# Patient Record
(demographics unavailable — no encounter records)

---

## 2025-04-22 NOTE — HISTORY OF PRESENT ILLNESS
[FreeTextEntry1] : 76 year old F with septal defect surgery, colon cancer s/p resection for stage 1 (2020) , anxiety  Here for eval of shoulder and hip pain She reports around 1 month ago (3/2025) she woke up with pain in legs (hip groin area)  Reports no significant changes in lifestyle other than taking Mg and also using some neck anti wrinkle cream Then she developed shoulder pain - difficulty combing hair, getting dressed, getting up from seated position Reports new onset numbness in right thumb morning stiffness reports chills.  up to date with colonoscopy and mammogram  Has fatigue No headaches, jaw claudication, no vision changes   Patient denies, joint swelling, joint erythema/warmth, , fever, , weight loss, nasopharyngeal ulcers, chest pain, abdominal pain, , cough, SOB, nausea, vomiting, diarrhea, , blood in stool, dysuria, hematuria, rash, , Raynaud's, alopecia, dry eyes, dry mouth (unless night time with mouth breathing),  headaches, eye pain/redness, vision changes,  muscle weakness, dysphagia, , Hx of DVT/PEs.    labs 3.2025 HsCRP  18.6 ESR 14 neg RF, Elena, RNP, SSA, SSB, dsDNA, Scl-70, centromere CCP       PMHx: As above PSHx: septal defect surgery, colon cancer s/p resection , cataracts surgery  Family Hx: Denies known family history of rheumatologic conditions including RA, SLE, Sjogren's, Myositis, scleroderma, or vasculitis Social Hx:  Smoking Hx: denies EtOH Hx: denies Drug use: denies Occupation: retired

## 2025-04-22 NOTE — ASSESSMENT
[FreeTextEntry1] : 76 year old F with septal defect surgery, colon cancer s/p resection for stage 1 (2020) , anxiety  Here for eval of shoulder and hip pain She reports around 1 month ago (3/2025) she woke up with pain in legs (hip groin area)  Reports no significant changes in lifestyle other than taking Mg and also using some neck anti wrinkle cream Then she developed shoulder pain - difficulty combing hair, getting dressed, getting up from seated position Reports new onset numbness in right thumb morning stiffness reports chills.  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 Patient does not have  malar rash, photosensitivity, sicca symptoms, Raynauds. Based on existing labs, patient does not have anemia, leukopenia, thrombocytopenia, kidney disease. Exam without synovitis, rashes, changes of Raynauds.CADY is a marker that is sensitive but not specific for underlying rheumatologic diseases such as lupus. The most common causes of positive CADY in patients without underlying rheumatologic diseases are infections, malignancies, and other autoimmune diseases such as Hashimotos. Up to 30% of patients without any underlying disease can have positive CADY.   Patient symptoms of sudden onset shoulder/hip stiffness and pain likely due to PMR (elevated CRP noted, normal ESR) Will do low dose trial of prednisone 15mg. If symptoms improve, continue taper (prednisone 15mg X4 weeks, then 12.5mg for 2 weeks, then 10mg for 2 weeks ) and follow up while on 10mg prednisone for further tapering or sooner if needed No GCA symptoms, educated on symptoms to monitor If patient does not respond to low dose prednisone, educated to contact me by next week Advised on the risk of long-term steroids including but not limited to osteonecrosis, peptic ulcers/erosive gastritis, elevated blood pressure, elevated blood sugar, weight gain, osteoporosis, cushingoid features, cataracts, glaucoma, infections. Given low dose and tapering method, will try to avoid long term side effects. Advised to take with food and not take NSAIDs   Follow up 7 weeks   Total time spent in review of patient history, clinical exam, management, counseling, and plan of care: 60 min

## 2025-04-22 NOTE — PHYSICAL EXAM
[TextEntry] :   GENERAL: Appears in no acute distress HEENT: EOMI. No conjunctival erythema. Moist mucous membranes. No nasopharyngeal ulcers NECK: Supple, no cervical lymphadenopathy CARDIOVASCULAR: RRR. S1, S2 auscultated. PULMONARY: Clear to auscultation b/l, MSK: No active synovitis, swelling, erythema, or warmth. No joint tenderness to palpation. No deformities. ROM of shoulders limited due to stiffness and pain (right worse than left) SKIN: No lesions or rashes NEURO: No focal deficits. Motor strength 5/5 in major muscle groups of b/l UE and LE. Sensation to soft touch intact in major dermatomes of b/l UE and LE. PSYCH: . Normal affect and thought process.  .

## 2025-06-10 NOTE — PHYSICAL EXAM
[TextEntry] :     GENERAL: Appears in no acute distress HEENT: EOMI. No conjunctival erythema. Moist mucous membranes. No nasopharyngeal ulcers NECK: Supple, no cervical lymphadenopathy CARDIOVASCULAR: RRR. S1, S2 auscultated. PULMONARY: Clear to auscultation b/l, MSK: No active synovitis, swelling, erythema, or warmth. No joint tenderness to palpation. No deformities. Range of motion of shoulders within normal limits  SKIN: No lesions or rashes NEURO: No focal deficits. Motor strength 5/5 in major muscle groups of b/l UE and LE. Sensation to soft touch intact in major dermatomes of b/l UE and LE. PSYCH: . Normal affect and thought process.

## 2025-06-10 NOTE — HISTORY OF PRESENT ILLNESS
[FreeTextEntry1] : 76 year old F with septal defect surgery, colon cancer s/p resection for stage 1 (2020) , anxiety following for PMR  Seen 4.22.25 Initially, as per note:  Here for eval of shoulder and hip pain  She reports around 1 month ago (3/2025) she woke up with pain in legs (hip groin area)  Reports no significant changes in lifestyle other than taking Mg and also using some neck anti wrinkle cream  Then she developed shoulder pain - difficulty combing hair, getting dressed, getting up from seated position  Reports new onset numbness in right thumb  morning stiffness  reports chills.  up to date with colonoscopy and mammogram  Has fatigue  No headaches, jaw claudication, no vision changes    Patient denies, joint swelling, joint erythema/warmth, , fever, , weight loss, nasopharyngeal ulcers, chest pain, abdominal pain, , cough, SOB, nausea, vomiting, diarrhea, , blood in stool, dysuria, hematuria, rash, , Raynaud's, alopecia, dry eyes, dry mouth (unless night time with mouth breathing), headaches, eye pain/redness, vision changes, muscle weakness, dysphagia, , Hx of DVT/PEs.      Did low dose trial of prednisone 15mg with good response.  s/p (prednisone 15mg X4 weeks, then 12.5mg for 2 weeks, then 10mg for 2 weeks ) and follow up while on 10mg prednisone for further tapering or sooner if needed She did not notice initially blurry vision while she was on prednisone however that has improved She went to the hospital and had high blood pressure and was put on amlodipine She did have an MRI showing concerns for a tumor and is pending a PET scan Denies PMR or GCA symptoms  labs  3.2025  HsCRP 18.6   ESR 14  neg RF, Elena, RNP, SSA, SSB, dsDNA, Scl-70, centromere CCP

## 2025-06-10 NOTE — ASSESSMENT
[FreeTextEntry1] : 76 year old F with septal defect surgery, colon cancer s/p resection for stage 1 (2020) , anxiety    Here for eval of shoulder and hip pain  She reports around 1 month ago (3/2025) she woke up with pain in legs (hip groin area)  Reports no significant changes in lifestyle other than taking Mg and also using some neck anti wrinkle cream  Then she developed shoulder pain - difficulty combing hair, getting dressed, getting up from seated position  Reports new onset numbness in right thumb  morning stiffness  reports chills.  up to date with colonoscopy and mammogram  Has fatigue  No headaches, jaw claudication, no vision changes  Serologies essentially unremarkable    +CADY with negative subserologies.   Patient does not have malar rash, photosensitivity, sicca symptoms, Raynauds. Based on existing labs, patient does not have anemia, leukopenia, thrombocytopenia, kidney disease. Exam without synovitis, rashes,.CADY is a marker that is sensitive but not specific for underlying rheumatologic diseases such as lupus. The most common causes of positive CADY in patients without underlying rheumatologic diseases are infections, malignancies, and other autoimmune diseases such as Hashimotos. Up to 30% of patients without any underlying disease can have positive CADY.  Patient symptoms of sudden onset shoulder/hip stiffness and pain likely due to PMR (elevated CRP noted, normal ESR)  Did  low dose trial of prednisone 15mg with good response.   s/p (prednisone 15mg X4 weeks, then 12.5mg for 2 weeks, then 10mg for 2 weeks ) and follow up while on 10mg prednisone for further tapering or sooner if needed  No GCA symptoms, educated on symptoms to monitor  Will taper prednisone by 1 mg every week until off... She will follow-up around 5 mg Also advised to see ophthalmology.  Educated on GCA symptoms to monitor Advised on the risk of long-term steroids including but not limited to osteonecrosis, peptic ulcers/erosive gastritis, elevated blood pressure, elevated blood sugar, weight gain, osteoporosis, cushingoid features, cataracts, glaucoma, infections. Given low dose and tapering method, will try to avoid long term side effects.  Advised to take with food and not take NSAIDs  Of note, she is pending PET scan by neurosurgery for finding of lesion on brain MRI.   f/u 6 weeks   Total time spent in review of patient history, clinical exam, management, counseling, and plan of care:  30min

## 2025-07-22 NOTE — PHYSICAL EXAM
[TextEntry] :     GENERAL: Appears in no acute distress HEENT: EOMI. No conjunctival erythema. Moist mucous membranes. No nasopharyngeal ulcers NECK: Supple, no cervical lymphadenopathy CARDIOVASCULAR: RRR. S1, S2 auscultated. PULMONARY: Clear to auscultation b/l, MSK: No active synovitis, swelling, erythema, or warmth. No joint tenderness to palpation. No deformities. Range of motion of shoulders within normal limits SKIN: No lesions or rashes NEURO: No focal deficits. Motor strength 5/5 in major muscle groups of b/l UE and LE. PSYCH: . Normal affect and thought process.

## 2025-07-22 NOTE — HISTORY OF PRESENT ILLNESS
[FreeTextEntry1] : 76 year old F with septal defect surgery, colon cancer s/p resection for stage 1 (2020) , anxiety following for PMR   Seen 4.22.25 Initially, as per note:  Here for eval of shoulder and hip pain  She reports around 1 month ago (3/2025) she woke up with pain in legs (hip groin area)  Reports no significant changes in lifestyle other than taking Mg and also using some neck anti wrinkle cream  Then she developed shoulder pain - difficulty combing hair, getting dressed, getting up from seated position  Reports new onset numbness in right thumb  morning stiffness  up to date with colonoscopy and mammogram  Has fatigue , chills No headaches, jaw claudication, no vision changes   Patient denies, joint swelling, joint erythema/warmth, , fever, , weight loss, nasopharyngeal ulcers, chest pain, abdominal pain, , cough, SOB, nausea, vomiting, diarrhea, , blood in stool, dysuria, hematuria, rash, , Raynaud's, alopecia, dry eyes, dry mouth (unless night time with mouth breathing), headaches, eye pain/redness, vision changes, muscle weakness, dysphagia, , Hx of DVT/PEs.     Did low dose trial of prednisone 15mg with good response.  s/p (prednisone 15mg X4 weeks, then 12.5mg for 2 weeks, then 10mg for 2 weeks ). now on prednisone 4mg (started on 7/20) She did not notice initially blurry vision - went to see ophtho and was found to have scar tissue due to a previous cataracts surgery ; had right eye surgery in which they removed scar tissue with improvement in vision . also pending left eye scar tissue removal She went to the hospital and had high blood pressure and was put on amlodipine , also added valsartan  She did have an MRI showing concerns for a tumor - found to have "meningioma "per patient on a PET scan for which she is following up with NSGY to follow up on the PET findings Denies PMR or GCA symptoms     labs   3.2025  HsCRP 18.6   ESR 14  neg RF, Elena, RNP, SSA, SSB, dsDNA, Scl-70, centromere CCP

## 2025-07-22 NOTE — ASSESSMENT
[FreeTextEntry1] : 76 year old F with septal defect surgery, colon cancer s/p resection for stage 1 (2020) , anxiety   following for PMR  Initial visit 4/2025  Here for eval of shoulder and hip pain.   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   Patient does not have malar rash, photosensitivity, sicca symptoms, Raynauds. Based on existing labs, patient does not have anemia, leukopenia, thrombocytopenia, kidney disease. Exam without synovitis, rashes,.CADY is a marker that is sensitive but not specific for underlying rheumatologic diseases such as lupus. The most common causes of positive CADY in patients without underlying rheumatologic diseases are infections, malignancies, and other autoimmune diseases such as Hashimotos. Up to 30% of patients without any underlying disease can have positive CADY.   Patient symptoms of sudden onset shoulder/hip stiffness and pain likely due to PMR (elevated CRP noted, normal ESR)  Did low dose trial of prednisone 15mg with good response.  s/p (prednisone 15mg X4 weeks, then 12.5mg for 2 weeks, then 10mg for 2 weeks ) - then has been tapering prednisone by 1mg every 1 week until off. On prednisone 4mg.  No PMR/GCA symptoms, educated on symptoms to monitor  Advised on the risk of long-term steroids including but not limited to osteonecrosis, peptic ulcers/erosive gastritis, elevated blood pressure, elevated blood sugar, weight gain, osteoporosis, cushingoid features, cataracts, glaucoma, infections. Given low dose and tapering method, will try to avoid long term side effects.  Advised to take with food and not take NSAIDs  Of note, she is pending  follow up with neurosurgery for PET scan findings.  Follow up 6 weeks   Total time spent in review of patient history, clinical exam, management, counseling, and plan of care:  30min

## 2025-07-30 NOTE — HISTORY OF PRESENT ILLNESS
[de-identified] : 78 y/o female with PMHx of stroke 1997- thought to have been from hole in her heart that threw clot s/p repair, hypercoagulable blood clotting disorder, colon CA s/p resection for stage 1 in 2020 (no chemo/RT), anxiety, recently dx with PMR autoimmune disease, cataract eye surgery, who presents for evaluation of multiple meningiomas with the largest one in the left cavernous sinus.  - Patient was recently dx with PMR autoimmune disease during workup for new onset shoulder and hip pain and started on prednisone. On prednisone she experienced high blood pressure and severe headache end of May for which she was seen in ER in [place] and as part of workup, was found to have a left cavernous meningioma.  - She saw neurosurgeon Dr. Kay outpatient and was ordered for dotatate Brain PET which was done 6/30/25 that showed: A Left cavernous sinus/left petroclival meningioma measuring 2.6 x 2.6 x 2.6 cm; Right paramedian frontal meningioma measuring 0.8 x 0.4 cm with invasion of the superior sagittal sinus at this level; Right anterior frontal meningioma measuring 2.5 mm; Right paraclinoid meningioma measuring 0.3 x 0.3 cm - She was waiting for follow- up with Dr. Kay for Dotatate review and saw neurosurgeon Dr. Edwards due to family friend recommendation who referred here.   Presents today for evaluation.  Notes she had recent blurred vision s/p cataract scar revision surgery which resolved vision issues. Without any visual complaints today.  Denies numbness/tingling/pain/diminished sensation/weakness of face.   Rheum: Shante Ashley

## 2025-07-30 NOTE — DATA REVIEWED
[de-identified] : ACC: [id number] EXAM: CT ANGIO BRAIN (W)AW IC ORDERED BY: FREEDOM OSULLIVAN  ACC: [id number] EXAM: CT ANGIO NECK (W)AW IC ORDERED BY: FREEDOM OSULLIVAN  PROCEDURE DATE: 07/07/2025    INTERPRETATION: CLINICAL INFORMATION: Headache  COMPARISON: CTA head and neck 5/23/2025. MRI head 5/23/2025  CONTRAST: IV Contrast: IV contrast documented in unlinked concurrent exam (accession [id number]), Omnipaque 350 (accession [id number]) 70 cc administered 0 cc discarded Complications: .  TECHNIQUE: CT BRAIN: Serial axial images were obtained from the skull base to the vertex without the use of contrast.  CTA NECK/HEAD: After the intravenous power injection of non-ionic contrast material, serial thin sections were obtained through the cervical and intracranial circulation on a multislice CT scanner. Axial, Coronal and Sagittal MIP reformatted images were obtained. 3D reconstruction was also performed.  FINDINGS:  CT BRAIN:  VENTRICLES AND SULCI: Reidentified Age appropriate involutional changes. INTRA-AXIAL: No mass effect, acute hemorrhage, or midline shift. Reidentified periventricular and subcortical white matter hypodensities, consistent with microvascular type changes. Chronic right frontal ischemic infarction. EXTRA-AXIAL: No mass or fluid collection. Basal cisterns are normal in appearance.  VISUALIZED SINUSES: Clear. TYMPANOMASTOID CAVITIES: Clear. VISUALIZED ORBITS: Bilateral lens replacement. CALVARIUM: Intact.  MISCELLANEOUS: Left dural based mass stable in size and configuration from prior exam   CTA NECK:  AORTIC ARCH AND VISUALIZED GREAT VESSELS: Within normal limits.  RIGHT: COMMON CAROTID ARTERY: No significant stenosis to the carotid bifurcation. INTERNAL CAROTID ARTERY: No significant stenosis based on NASCET criteria. VERTEBRAL ARTERY: Normal in course and caliber to the intracranial circulation. Retropharyngeal carotid anatomic variant.  LEFT: COMMON CAROTID ARTERY: No significant stenosis to the carotid bifurcation. INTERNAL CAROTID ARTERY: No significant stenosis based on NASCET criteria. VERTEBRAL ARTERY: Normal in course and caliber to the intracranial circulation.  VISUALIZED LUNGS: Clear.  MISCELLANEOUS: Stable multilevel degenerative change.  CAROTID STENOSIS REFERENCE: Percent (%) stenosis is expressed in terms of NASCET Criteria. (NASCET = 100x1-(N/D)). N=greatest narrowing. D=normal distal diameter - MILD = <50% stenosis. - MODERATE = 50-69% stenosis. - SEVERE = 70-89% stenosis. - HAIRLINE/CRITICAL = 90-99% stenosis. - OCCLUDED = 100% stenosis.   CTA HEAD:  INTERNAL CAROTID ARTERIES: Right petrous, precavernous, cavernous, and supraclinoid regions are patent without significant stenosis. Reidentified focal moderate to severe stenosis left ICA distal petrous/proximal cavernous sinus juncture with surrounding dural based mass.  [place] OF ARMENDARIZ: No aneurysm identified. Tiny aneurysms can be beyond the resolution of CTA technique.  ANTERIOR CEREBRAL ARTERIES: No significant stenosis or occlusion. MIDDLE CEREBRAL ARTERIES: No significant stenosis or occlusion. POSTERIOR CEREBRAL ARTERIES: No significant stenosis or occlusion.  DISTAL VERTEBRAL / BASILAR ARTERIES: No significant stenosis or occlusion.  VENOUS STRUCTURES: Inadequate venous phase opacification.  MISCELLANEOUS: No other vascular abnormality is seen.   IMPRESSION:  CT HEAD: No evidence of acute intracranial pathology. Chronic findings as described. Stable left cavernous dural based mass. Follow-up as clinically warranted.  CTA NECK: No evidence of significant stenosis or occlusion. Right retropharyngeal carotid anatomic variant, attention on any potential future intervention.  CTA HEAD: Similar appearing degree of stenosis left ICA distal petrous/proximal cavernous juncture conjunction with surrounding dural based mass.    --- End of Report ---   [de-identified] :     ACC: [id number] EXAM: MR IAC ONLY WAW IC ORDERED BY: ARIA POSEY  ACC: [id number] EXAM: MR ANGIO NECK ORDERED BY: ARIA POSEY  ACC: [id number] EXAM: MR ANGIO BRAIN ORDERED BY: ARIA POSEY  PROCEDURE DATE: 05/23/2025    INTERPRETATION: MR brain and internal auditory canals with and without gadolinium    CLINICAL INFORMATION: Follow-up skull base meningioma TECHNIQUE: Brain: Sagittal and axial T1-weighted images, axial FLAIR images, axial T2-weighted images and axial diffusion and gradient echo weighted images of the brain were obtained. Following intravenous administration of 6 cc of Gadavist, 1.5 cc discarded, T1-weighted images of the brain were obtained. Internal auditory canals: High resolution coronal T1-weighted, coronal volumetric T2-weighted images and axial and coronal postgadolinium T1-weighted images through the internal auditory canals were obtained.  FINDINGS: CT head dated 05/23/2025 available for review.  The brain demonstrates mild periventricular, deep and scattered subcortical chronic white matter ischemia. Small old cortical infarction is seen in the RIGHT frontal lobe. No acute cerebral cortical infarct is found. No intracranial hemorrhage is recognized. No mass effect is found in the brain. The ventricles, sulci and basal cisterns appear unremarkable. There is a LEFT cavernous meningioma measuring 2.5 cm AP by 2.4 cm TRV by 1.7 cm cc surrounding the cavernous internal carotid artery with narrowing of the vessel to a moderate degree. There is associated ex dural thickening in the LEFT prepontine cistern which abuts the basilar artery without compression.  The internal auditory canals are intact without focal lesion, abnormal enhancement or expansion. The inner ear structures appear intact and normally formed. Petrous temporal bones are intact.  The vertebral and internal carotid arteries demonstrate expected flow voids indicating their patency.  The orbits are unremarkable. The lenses are surgically small. The paranasal sinuses are clear. The nasal cavity appears intact. The nasopharynx is symmetric. The central skull base is intact. The calvarium appears unremarkable. Anesthetic filler is seen in the malar fat.    MR angiography of the intracranial circulation. (Non gadolinium technique.)   CLINICAL INFORMATION: stroke; vascular stenosis.  TECHNIQUE: MR angiography of intracranial circulation was performed using three-dimensional time of flight technique. Post processing angiographic reconstruction of images was performed. This data set was reconstructed as maximum intensity pixel images and displayed in multiple rotations.  FINDINGS: No comparison similar studies are available.  The anterior circulation demonstrates intact petrous, cavernous and clinoid segments of the internal carotid arteries with focal narrowing of the proximal LEFT cavernous internal carotid artery due to known surrounding meningioma. The anterior cerebral arteries are intact and symmetric. An anterior communicating artery is demonstrated. The middle cerebral arteries demonstrate intact initial M1 and M2 segments. There are symmetric intact peripheral Sylvian branches.  The posterior circulation demonstrates intact vertebral, basilar and posterior cerebral arteries.  No abnormal vessel termination, vascular malformation or intracranial aneurysm is recognized.   MR angiography of the carotid circulation. (Non gadolinium technique.)   CLINICAL INFORMATION: TIA, Stroke.  TECHNIQUE: Two dimensional time of flight MR angiography of the carotid circulation was performed. This data set was reconstructed as maximum intensity pixel images displayed in multiple rotations. An additional three dimensional time of flight acquisition was obtained with anatomic coverage limited to the carotid bulbs. The magnitude of stenosis was evaluated based on the diameter of an intact distal of the internal carotid artery using NASCET criteria.  FINDINGS: No prior similar studies are available for review.  The right carotid system demonstrates an intact common carotid artery. The carotid bulb is intact without hemodynamically significant stenosis. The internal carotid artery is intact to the level the skull base. The origin and initial segment of the right external carotid artery also appears intact.  The left carotid system demonstrates an intact common carotid artery. The carotid bulb is intact without hemodynamically significant stenosis. The internal carotid artery is intact to the level the skull base. The origin and initial segment of the left external carotid artery also appears intact.  The vertebral arteries demonstrate antegrade flow. The degree of vertebral asymmetry is within physiologic variation, the LEFT is dominant.  IMPRESSION:  1. Brain/Internal auditory canals: LEFT cavernous meningioma measuring 2.5 cm AP by 2.4 cm TRV by 1.7 cm cc surrounding the cavernous internal carotid artery with narrowing of the vessel to a moderate degree. There is associated ex dural thickening in the LEFT prepontine cistern which abuts the basilar artery without compression. Mild periventricular, deep and scattered subcortical chronic white matter ischemia. Small old cortical infarction is seen in the RIGHT frontal lobe.   2. MRA brain: Focal narrowing of the proximal LEFT cavernous internal carotid artery due to known surrounding meningioma..  3. MRA NECK: No evidence of stenosis at the origins of the BILATERAL internal carotid arteries. The vertebral arteries are patent.  --- End of Report ---      MANJINDER JONES MD; Attending Radiologist [de-identified] :   EXAM: [id number] - NM BRAIN FUSION WITH MR - ORDERED BY: MATILDA NOLASCO  EXAM: [id number] - PETCT LTD DOTATATE ONC INIT - ORDERED BY: MATILDA NOLASCO   PROCEDURE DATE: 06/30/2025    INTERPRETATION: HISTORY: Left cavernous sinus mass.  TECHNIQUE: On 6/30/2025 patient was given 4.1 mCi of Cu-64 DOTATATE ([employer]) intravenously while semisupine in the resting state. 53 min post injection of radiotracer, dedicated PET and CT images of the brain were obtained as per routine protocol. The CT protocol was optimized for PET attenuation correction and to provide anatomic detail for localization of PET abnormalities. PET-MRI fusions were performed utilizing [employer] for optimized anatomic localization.  COMPARISON: MRI brain 5/23/2025.  FINDINGS:  For reference, SUVmax in the superior sagittal sinus is 2.89.  There is avid tracer uptake (SUVmax 27.46) associated with an enhancing extra-axial dural-based lesion which arises in the left cavernous sinus/left petroclival region and measures approximately 2.6 x 2.6 x 2.6 cm in maximum AP by transverse by CC dimensions, findings consistent with a meningioma. Note, anteriorly the lesion involves both the left cavernous sinus and left Meckel's cave, with encasement of the left cavernous segment carotid artery which is narrowed at this level, but remains patent. The lesion also extends into the sphenoid sinuses bilaterally. Medially, the lesion involves the sella turcica and it is difficult to differentiate normal pituitary tissue from the mass. The lesion also abuts and appears to extend into the right cavernous sinus. Inferiorly, the lesion invades the clivus and fills the prepontine cistern (left greater than right), abutting the basilar artery.  There is avid tracer uptake (SUVmax 61.4) associated with enhancing right paramedian frontal extra-axial dural based mass measuring 0.8 x 0.4 cm in maximum transaxial dimensions, findings consistent with a meningioma. Note, the lesion appears to invade the superior sagittal sinus at this level.  There is avid tracer uptake (SUVmax 5.92) associated with enhancing extra-axial dural based 2.5 mm lesion at the right anterior frontal convexity, findings consistent with a tiny meningioma.  There is avid tracer uptake (SUVmax 5.49) associated with enhancing right paraclinoid extra-axial dural based 0.3 x 0.3 cm lesion, findings consistent with an additional small meningioma.  There are no other DOTATATE avid lesions in the head.  There are scattered T2/FLAIR hyperintense foci in the subcortical and periventricular white matter, nonspecific finding, favored to reflect sequelae of mild chronic microvascular disease (Fazekas grade 1).  There is gliosis/encephalomalacia in the right frontal lobe, findings consistent with sequelae of chronic infarct.  There is cortical sulcal prominence related to underlying mild brain parenchymal volume loss  No acute infarction, intracranial hemorrhage or intra-axial mass.  There is no evidence of hydrocephalus. There are no extra-axial fluid collections. The skull base flow voids are present.  The patient is status post bilateral lens replacement. The imaged portions of the paranasal sinuses are aerated. The mastoid air cells are clear. Incidentally noted is dermal  the bilateral malar fat. Visualized soft tissues and osseous structures appear otherwise unremarkable.   IMPRESSION:  Left cavernous sinus/left petroclival meningioma measuring 2.6 x 2.6 x 2.6 cm (SUVmax 27.46).  Right paramedian frontal meningioma measuring 0.8 x 0.4 cm (SUVmax 61.4), with invasion of the superior sagittal sinus at this level.  Right anterior frontal meningioma measuring 2.5 mm (SUVmax 5.92).  Right paraclinoid meningioma measuring 0.3 x 0.3 cm (SUVmax 5.49).  --- End of Report ---

## 2025-07-30 NOTE — ASSESSMENT
[FreeTextEntry1] : This is a 77-year-old woman with a complex medical history including multiple meningiomas identified on DOTATATE PET imaging, a remote history of stroke in 1997 attributed to a hypercoagulable state (with subsequent closure of a cardiac defect), colon cancer treated by resection in 2020, polymyalgia rheumatica (PMR) recently managed with prednisone, and a recent hypertensive crisis with severe headache likely related to corticosteroid use. She is currently being evaluated for fractionated radiosurgery to address her meningiomas, as there is concern for orbital involvement, and she is scheduled for a follow-up next week to finalize her treatment plan. She reports persistent anxiety related to her meningioma diagnosis and upcoming treatment, numbness in her fingertips, but no visual changes or new neurological symptoms on recent examination. Her PMR is stable on a low prednisone dose, but there is ongoing concern about hypertension, necessitating continued monitoring and possible medication adjustments. No immediate intervention is needed for her prior stroke, though stroke risk factor surveillance remains important, and she continues standard oncologic follow-up for her history of colon cancer without current evidence of recurrence. The plan is to proceed with coordination for fractionated radiosurgery, maintain close follow-up for PMR and hypertension management in conjunction with her primary care team, and offer support for her anxiety, including counseling resources.  Dr. D'Amico independently reviewed all available images with patient and her spouse and daughter.    PLAN: - Referral to [facility] Dr. Schilling for radiation consultation of fractionated radiation vs SRS - Continue f/u with rheum Dr. Shante Ramirez for PMR   Patient and her family members verbalize understanding of today's discussion and next steps in treatment plan.   Today, my ACP, Lakeisha Lantigua, was here to observe my evaluation and management services for this patient to be followed going forward.     Patient appreciates and agrees with current plan. This note was generated using medical dictation software. A reasonable effort has been made for proofreading its contents, but typos may still remain. If there are any questions or points of clarification needed, please notify my office.  A total of 45 minutes was spent reviewing the labs, imaging and physical examination of the patient. We discussed the diagnosis, and the plan. The patient's questions were answered. The patient demonstrated an excellent understanding of the plan.

## 2025-07-30 NOTE — PHYSICAL EXAM
[General Appearance - Alert] : alert [General Appearance - In No Acute Distress] : in no acute distress [General Appearance - Well-Appearing] : healthy appearing [Oriented To Time, Place, And Person] : oriented to person, place, and time [Impaired Insight] : insight and judgment were intact [Affect] : the affect was normal [Memory Recent] : recent memory was not impaired [Motor Tone] : muscle tone was normal in all four extremities [Motor Strength] : muscle strength was normal in all four extremities [Sclera] : the sclera and conjunctiva were normal [Extraocular Movements] : extraocular movements were intact [Neck Appearance] : the appearance of the neck was normal [] : no respiratory distress [Respiration, Rhythm And Depth] : normal respiratory rhythm and effort [Abnormal Walk] : normal gait [Skin Color & Pigmentation] : normal skin color and pigmentation [FreeTextEntry5] : CN II-XII grossly intact

## 2025-07-30 NOTE — DATA REVIEWED
[de-identified] : ACC: [id number] EXAM: CT ANGIO BRAIN (W)AW IC ORDERED BY: FREEDOM OSULLIVAN  ACC: [id number] EXAM: CT ANGIO NECK (W)AW IC ORDERED BY: FREEDOM OSULLIVAN  PROCEDURE DATE: 07/07/2025    INTERPRETATION: CLINICAL INFORMATION: Headache  COMPARISON: CTA head and neck 5/23/2025. MRI head 5/23/2025  CONTRAST: IV Contrast: IV contrast documented in unlinked concurrent exam (accession [id number]), Omnipaque 350 (accession [id number]) 70 cc administered 0 cc discarded Complications: .  TECHNIQUE: CT BRAIN: Serial axial images were obtained from the skull base to the vertex without the use of contrast.  CTA NECK/HEAD: After the intravenous power injection of non-ionic contrast material, serial thin sections were obtained through the cervical and intracranial circulation on a multislice CT scanner. Axial, Coronal and Sagittal MIP reformatted images were obtained. 3D reconstruction was also performed.  FINDINGS:  CT BRAIN:  VENTRICLES AND SULCI: Reidentified Age appropriate involutional changes. INTRA-AXIAL: No mass effect, acute hemorrhage, or midline shift. Reidentified periventricular and subcortical white matter hypodensities, consistent with microvascular type changes. Chronic right frontal ischemic infarction. EXTRA-AXIAL: No mass or fluid collection. Basal cisterns are normal in appearance.  VISUALIZED SINUSES: Clear. TYMPANOMASTOID CAVITIES: Clear. VISUALIZED ORBITS: Bilateral lens replacement. CALVARIUM: Intact.  MISCELLANEOUS: Left dural based mass stable in size and configuration from prior exam   CTA NECK:  AORTIC ARCH AND VISUALIZED GREAT VESSELS: Within normal limits.  RIGHT: COMMON CAROTID ARTERY: No significant stenosis to the carotid bifurcation. INTERNAL CAROTID ARTERY: No significant stenosis based on NASCET criteria. VERTEBRAL ARTERY: Normal in course and caliber to the intracranial circulation. Retropharyngeal carotid anatomic variant.  LEFT: COMMON CAROTID ARTERY: No significant stenosis to the carotid bifurcation. INTERNAL CAROTID ARTERY: No significant stenosis based on NASCET criteria. VERTEBRAL ARTERY: Normal in course and caliber to the intracranial circulation.  VISUALIZED LUNGS: Clear.  MISCELLANEOUS: Stable multilevel degenerative change.  CAROTID STENOSIS REFERENCE: Percent (%) stenosis is expressed in terms of NASCET Criteria. (NASCET = 100x1-(N/D)). N=greatest narrowing. D=normal distal diameter - MILD = <50% stenosis. - MODERATE = 50-69% stenosis. - SEVERE = 70-89% stenosis. - HAIRLINE/CRITICAL = 90-99% stenosis. - OCCLUDED = 100% stenosis.   CTA HEAD:  INTERNAL CAROTID ARTERIES: Right petrous, precavernous, cavernous, and supraclinoid regions are patent without significant stenosis. Reidentified focal moderate to severe stenosis left ICA distal petrous/proximal cavernous sinus juncture with surrounding dural based mass.  Craig OF ARMENDARIZ: No aneurysm identified. Tiny aneurysms can be beyond the resolution of CTA technique.  ANTERIOR CEREBRAL ARTERIES: No significant stenosis or occlusion. MIDDLE CEREBRAL ARTERIES: No significant stenosis or occlusion. POSTERIOR CEREBRAL ARTERIES: No significant stenosis or occlusion.  DISTAL VERTEBRAL / BASILAR ARTERIES: No significant stenosis or occlusion.  VENOUS STRUCTURES: Inadequate venous phase opacification.  MISCELLANEOUS: No other vascular abnormality is seen.   IMPRESSION:  CT HEAD: No evidence of acute intracranial pathology. Chronic findings as described. Stable left cavernous dural based mass. Follow-up as clinically warranted.  CTA NECK: No evidence of significant stenosis or occlusion. Right retropharyngeal carotid anatomic variant, attention on any potential future intervention.  CTA HEAD: Similar appearing degree of stenosis left ICA distal petrous/proximal cavernous juncture conjunction with surrounding dural based mass.    --- End of Report ---   [de-identified] :     ACC: [id number] EXAM: MR IAC ONLY WAW IC ORDERED BY: ARIA POSEY  ACC: [id number] EXAM: MR ANGIO NECK ORDERED BY: ARIA POSEY  ACC: [id number] EXAM: MR ANGIO BRAIN ORDERED BY: ARIA POSEY  PROCEDURE DATE: 05/23/2025    INTERPRETATION: MR brain and internal auditory canals with and without gadolinium    CLINICAL INFORMATION: Follow-up skull base meningioma TECHNIQUE: Brain: Sagittal and axial T1-weighted images, axial FLAIR images, axial T2-weighted images and axial diffusion and gradient echo weighted images of the brain were obtained. Following intravenous administration of 6 cc of Gadavist, 1.5 cc discarded, T1-weighted images of the brain were obtained. Internal auditory canals: High resolution coronal T1-weighted, coronal volumetric T2-weighted images and axial and coronal postgadolinium T1-weighted images through the internal auditory canals were obtained.  FINDINGS: CT head dated 05/23/2025 available for review.  The brain demonstrates mild periventricular, deep and scattered subcortical chronic white matter ischemia. Small old cortical infarction is seen in the RIGHT frontal lobe. No acute cerebral cortical infarct is found. No intracranial hemorrhage is recognized. No mass effect is found in the brain. The ventricles, sulci and basal cisterns appear unremarkable. There is a LEFT cavernous meningioma measuring 2.5 cm AP by 2.4 cm TRV by 1.7 cm cc surrounding the cavernous internal carotid artery with narrowing of the vessel to a moderate degree. There is associated ex dural thickening in the LEFT prepontine cistern which abuts the basilar artery without compression.  The internal auditory canals are intact without focal lesion, abnormal enhancement or expansion. The inner ear structures appear intact and normally formed. Petrous temporal bones are intact.  The vertebral and internal carotid arteries demonstrate expected flow voids indicating their patency.  The orbits are unremarkable. The lenses are surgically small. The paranasal sinuses are clear. The nasal cavity appears intact. The nasopharynx is symmetric. The central skull base is intact. The calvarium appears unremarkable. Anesthetic filler is seen in the malar fat.    MR angiography of the intracranial circulation. (Non gadolinium technique.)   CLINICAL INFORMATION: stroke; vascular stenosis.  TECHNIQUE: MR angiography of intracranial circulation was performed using three-dimensional time of flight technique. Post processing angiographic reconstruction of images was performed. This data set was reconstructed as maximum intensity pixel images and displayed in multiple rotations.  FINDINGS: No comparison similar studies are available.  The anterior circulation demonstrates intact petrous, cavernous and clinoid segments of the internal carotid arteries with focal narrowing of the proximal LEFT cavernous internal carotid artery due to known surrounding meningioma. The anterior cerebral arteries are intact and symmetric. An anterior communicating artery is demonstrated. The middle cerebral arteries demonstrate intact initial M1 and M2 segments. There are symmetric intact peripheral Sylvian branches.  The posterior circulation demonstrates intact vertebral, basilar and posterior cerebral arteries.  No abnormal vessel termination, vascular malformation or intracranial aneurysm is recognized.   MR angiography of the carotid circulation. (Non gadolinium technique.)   CLINICAL INFORMATION: TIA, Stroke.  TECHNIQUE: Two dimensional time of flight MR angiography of the carotid circulation was performed. This data set was reconstructed as maximum intensity pixel images displayed in multiple rotations. An additional three dimensional time of flight acquisition was obtained with anatomic coverage limited to the carotid bulbs. The magnitude of stenosis was evaluated based on the diameter of an intact distal of the internal carotid artery using NASCET criteria.  FINDINGS: No prior similar studies are available for review.  The right carotid system demonstrates an intact common carotid artery. The carotid bulb is intact without hemodynamically significant stenosis. The internal carotid artery is intact to the level the skull base. The origin and initial segment of the right external carotid artery also appears intact.  The left carotid system demonstrates an intact common carotid artery. The carotid bulb is intact without hemodynamically significant stenosis. The internal carotid artery is intact to the level the skull base. The origin and initial segment of the left external carotid artery also appears intact.  The vertebral arteries demonstrate antegrade flow. The degree of vertebral asymmetry is within physiologic variation, the LEFT is dominant.  IMPRESSION:  1. Brain/Internal auditory canals: LEFT cavernous meningioma measuring 2.5 cm AP by 2.4 cm TRV by 1.7 cm cc surrounding the cavernous internal carotid artery with narrowing of the vessel to a moderate degree. There is associated ex dural thickening in the LEFT prepontine cistern which abuts the basilar artery without compression. Mild periventricular, deep and scattered subcortical chronic white matter ischemia. Small old cortical infarction is seen in the RIGHT frontal lobe.   2. MRA brain: Focal narrowing of the proximal LEFT cavernous internal carotid artery due to known surrounding meningioma..  3. MRA NECK: No evidence of stenosis at the origins of the BILATERAL internal carotid arteries. The vertebral arteries are patent.  --- End of Report ---      MANJINDER JONES MD; Attending Radiologist [de-identified] :   EXAM: [id number] - NM BRAIN FUSION WITH MR - ORDERED BY: MATILDA NOLASCO  EXAM: [id number] - PETCT LTD DOTATATE ONC INIT - ORDERED BY: MATILDA NOLASCO   PROCEDURE DATE: 06/30/2025    INTERPRETATION: HISTORY: Left cavernous sinus mass.  TECHNIQUE: On 6/30/2025 patient was given 4.1 mCi of Cu-64 DOTATATE ([employer]) intravenously while semisupine in the resting state. 53 min post injection of radiotracer, dedicated PET and CT images of the brain were obtained as per routine protocol. The CT protocol was optimized for PET attenuation correction and to provide anatomic detail for localization of PET abnormalities. PET-MRI fusions were performed utilizing Synedgen for optimized anatomic localization.  COMPARISON: MRI brain 5/23/2025.  FINDINGS:  For reference, SUVmax in the superior sagittal sinus is 2.89.  There is avid tracer uptake (SUVmax 27.46) associated with an enhancing extra-axial dural-based lesion which arises in the left cavernous sinus/left petroclival region and measures approximately 2.6 x 2.6 x 2.6 cm in maximum AP by transverse by CC dimensions, findings consistent with a meningioma. Note, anteriorly the lesion involves both the left cavernous sinus and left Meckel's cave, with encasement of the left cavernous segment carotid artery which is narrowed at this level, but remains patent. The lesion also extends into the sphenoid sinuses bilaterally. Medially, the lesion involves the sella turcica and it is difficult to differentiate normal pituitary tissue from the mass. The lesion also abuts and appears to extend into the right cavernous sinus. Inferiorly, the lesion invades the clivus and fills the prepontine cistern (left greater than right), abutting the basilar artery.  There is avid tracer uptake (SUVmax 61.4) associated with enhancing right paramedian frontal extra-axial dural based mass measuring 0.8 x 0.4 cm in maximum transaxial dimensions, findings consistent with a meningioma. Note, the lesion appears to invade the superior sagittal sinus at this level.  There is avid tracer uptake (SUVmax 5.92) associated with enhancing extra-axial dural based 2.5 mm lesion at the right anterior frontal convexity, findings consistent with a tiny meningioma.  There is avid tracer uptake (SUVmax 5.49) associated with enhancing right paraclinoid extra-axial dural based 0.3 x 0.3 cm lesion, findings consistent with an additional small meningioma.  There are no other DOTATATE avid lesions in the head.  There are scattered T2/FLAIR hyperintense foci in the subcortical and periventricular white matter, nonspecific finding, favored to reflect sequelae of mild chronic microvascular disease (Fazekas grade 1).  There is gliosis/encephalomalacia in the right frontal lobe, findings consistent with sequelae of chronic infarct.  There is cortical sulcal prominence related to underlying mild brain parenchymal volume loss  No acute infarction, intracranial hemorrhage or intra-axial mass.  There is no evidence of hydrocephalus. There are no extra-axial fluid collections. The skull base flow voids are present.  The patient is status post bilateral lens replacement. The imaged portions of the paranasal sinuses are aerated. The mastoid air cells are clear. Incidentally noted is dermal  the bilateral malar fat. Visualized soft tissues and osseous structures appear otherwise unremarkable.   IMPRESSION:  Left cavernous sinus/left petroclival meningioma measuring 2.6 x 2.6 x 2.6 cm (SUVmax 27.46).  Right paramedian frontal meningioma measuring 0.8 x 0.4 cm (SUVmax 61.4), with invasion of the superior sagittal sinus at this level.  Right anterior frontal meningioma measuring 2.5 mm (SUVmax 5.92).  Right paraclinoid meningioma measuring 0.3 x 0.3 cm (SUVmax 5.49).  --- End of Report ---

## 2025-07-30 NOTE — HISTORY OF PRESENT ILLNESS
[de-identified] : 76 y/o female with PMHx of stroke 1997- thought to have been from hole in her heart that threw clot s/p repair, hypercoagulable blood clotting disorder, colon CA s/p resection for stage 1 in 2020 (no chemo/RT), anxiety, recently dx with PMR autoimmune disease, cataract eye surgery, who presents for evaluation of multiple meningiomas with the largest one in the left cavernous sinus.  - Patient was recently dx with PMR autoimmune disease during workup for new onset shoulder and hip pain and started on prednisone. On prednisone she experienced high blood pressure and severe headache end of May for which she was seen in ER in [place] and as part of workup, was found to have a left cavernous meningioma.  - She saw neurosurgeon Dr. Kay outpatient and was ordered for dotatate Brain PET which was done 6/30/25 that showed: A Left cavernous sinus/left petroclival meningioma measuring 2.6 x 2.6 x 2.6 cm; Right paramedian frontal meningioma measuring 0.8 x 0.4 cm with invasion of the superior sagittal sinus at this level; Right anterior frontal meningioma measuring 2.5 mm; Right paraclinoid meningioma measuring 0.3 x 0.3 cm - She was waiting for follow- up with Dr. Kay for Dotatate review and saw neurosurgeon Dr. Edwards due to family friend recommendation who referred here.   Presents today for evaluation.  Notes she had recent blurred vision s/p cataract scar revision surgery which resolved vision issues. Without any visual complaints today.  Denies numbness/tingling/pain/diminished sensation/weakness of face.   Rheum: Shante Ashley

## 2025-07-30 NOTE — REASON FOR VISIT
[New Patient Visit] : a new patient visit [Spouse] : spouse [Family Member] : family member [FreeTextEntry1] : multiple meningiomas

## 2025-07-30 NOTE — ASSESSMENT
[FreeTextEntry1] : This is a 77-year-old woman with a complex medical history including multiple meningiomas identified on DOTATATE PET imaging, a remote history of stroke in 1997 attributed to a hypercoagulable state (with subsequent closure of a cardiac defect), colon cancer treated by resection in 2020, polymyalgia rheumatica (PMR) recently managed with prednisone, and a recent hypertensive crisis with severe headache likely related to corticosteroid use. She is currently being evaluated for fractionated radiosurgery to address her meningiomas, as there is concern for orbital involvement, and she is scheduled for a follow-up next week to finalize her treatment plan. She reports persistent anxiety related to her meningioma diagnosis and upcoming treatment, numbness in her fingertips, but no visual changes or new neurological symptoms on recent examination. Her PMR is stable on a low prednisone dose, but there is ongoing concern about hypertension, necessitating continued monitoring and possible medication adjustments. No immediate intervention is needed for her prior stroke, though stroke risk factor surveillance remains important, and she continues standard oncologic follow-up for her history of colon cancer without current evidence of recurrence. The plan is to proceed with coordination for fractionated radiosurgery, maintain close follow-up for PMR and hypertension management in conjunction with her primary care team, and offer support for her anxiety, including counseling resources.  Dr. D'Amico independently reviewed all available images with patient and her spouse and daughter.    PLAN: - Referral to [facility] Dr. Schilling for radiation consultation of fractionated radiation vs SRS - Continue f/u with rheum Dr. Shante Ramirez for PMR   Patient and her family members verbalize understanding of today's discussion and next steps in treatment plan.   Today, my ACP, Lakeisha Lantigua, was here to observe my evaluation and management services for this patient to be followed going forward.     Patient appreciates and agrees with current plan. This note was generated using medical dictation software. A reasonable effort has been made for proofreading its contents, but typos may still remain. If there are any questions or points of clarification needed, please notify my office.  A total of 45 minutes was spent reviewing the labs, imaging and physical examination of the patient. We discussed the diagnosis, and the plan. The patient's questions were answered. The patient demonstrated an excellent understanding of the plan.